# Patient Record
Sex: MALE | Race: WHITE | Employment: OTHER | URBAN - METROPOLITAN AREA
[De-identification: names, ages, dates, MRNs, and addresses within clinical notes are randomized per-mention and may not be internally consistent; named-entity substitution may affect disease eponyms.]

---

## 2023-10-24 RX ORDER — CALCITRIOL 0.25 UG/1
0.25 CAPSULE ORAL DAILY
COMMUNITY
End: 2024-04-02 | Stop reason: SDUPTHER

## 2023-10-24 RX ORDER — TADALAFIL 5 MG/1
5 TABLET ORAL DAILY
COMMUNITY
End: 2023-11-07

## 2023-10-24 RX ORDER — ATORVASTATIN CALCIUM 40 MG/1
40 TABLET, FILM COATED ORAL NIGHTLY
COMMUNITY

## 2023-10-24 RX ORDER — ERGOCALCIFEROL 1.25 MG/1
1 CAPSULE ORAL
COMMUNITY
Start: 2023-10-18

## 2023-10-24 RX ORDER — LEVOTHYROXINE SODIUM 137 UG/1
TABLET ORAL
COMMUNITY
End: 2023-10-25

## 2023-10-25 DIAGNOSIS — E03.8 OTHER SPECIFIED HYPOTHYROIDISM: Primary | ICD-10-CM

## 2023-10-25 RX ORDER — LEVOTHYROXINE SODIUM 137 UG/1
TABLET ORAL
Qty: 100 TABLET | Refills: 0 | Status: SHIPPED | OUTPATIENT
Start: 2023-10-25 | End: 2024-01-15 | Stop reason: SDUPTHER

## 2023-11-06 DIAGNOSIS — N52.9 MALE ERECTILE DISORDER: Primary | ICD-10-CM

## 2023-11-07 RX ORDER — TADALAFIL 5 MG/1
5 TABLET ORAL DAILY
Qty: 30 TABLET | Refills: 0 | Status: SHIPPED | OUTPATIENT
Start: 2023-11-07 | End: 2024-02-20

## 2024-01-06 LAB
NON-UH HIE A/G RATIO: 0.9
NON-UH HIE ALB: 3.2 G/DL (ref 3.4–5)
NON-UH HIE ALK PHOS: 54 UNIT/L (ref 45–117)
NON-UH HIE BILIRUBIN, TOTAL: 0.4 MG/DL (ref 0.3–1.2)
NON-UH HIE BUN/CREAT RATIO: 19.6
NON-UH HIE BUN: 49 MG/DL (ref 9–23)
NON-UH HIE CALCIUM: 9.4 MG/DL (ref 8.7–10.4)
NON-UH HIE CALCULATED LDL CHOLESTEROL: 70 MG/DL (ref 60–130)
NON-UH HIE CALCULATED OSMOLALITY: 290 MOSM/KG (ref 275–295)
NON-UH HIE CHLORIDE: 112 MMOL/L (ref 98–107)
NON-UH HIE CHOLESTEROL: 134 MG/DL (ref 100–200)
NON-UH HIE CO2, VENOUS: 20 MMOL/L (ref 20–31)
NON-UH HIE CREATININE: 2.5 MG/DL (ref 0.6–1.1)
NON-UH HIE GFR AA: 30
NON-UH HIE GLOBULIN: 3.7 G/DL
NON-UH HIE GLOMERULAR FILTRATION RATE: 25 ML/MIN/1.73M?
NON-UH HIE GLUCOSE: 91 MG/DL (ref 74–106)
NON-UH HIE GOT: 18 UNIT/L (ref 15–37)
NON-UH HIE GPT: 11 UNIT/L (ref 10–49)
NON-UH HIE HCT: 31.4 % (ref 41–52)
NON-UH HIE HDL CHOLESTEROL: 55 MG/DL (ref 40–60)
NON-UH HIE HGB: 10.6 G/DL (ref 13.5–17.5)
NON-UH HIE INSTR WBC ND: 5.9
NON-UH HIE K: 5.6 MMOL/L (ref 3.5–5.1)
NON-UH HIE MCH: 34.5 PG (ref 27–34)
NON-UH HIE MCHC: 33.8 G/DL (ref 32–37)
NON-UH HIE MCV: 102.2 FL (ref 80–100)
NON-UH HIE MPV: 8.3 FL (ref 7.4–10.4)
NON-UH HIE NA: 139 MMOL/L (ref 135–145)
NON-UH HIE PLATELET: 218 X10 (ref 150–450)
NON-UH HIE RBC: 3.07 X10 (ref 4.7–6.1)
NON-UH HIE RDW: 14.5 % (ref 11.5–14.5)
NON-UH HIE TOTAL CHOL/HDL CHOL RATIO: 2.4
NON-UH HIE TOTAL PROTEIN: 6.9 G/DL (ref 5.7–8.2)
NON-UH HIE TRIGLYCERIDES: 47 MG/DL (ref 30–150)
NON-UH HIE TSH: 5.69 UIU/ML (ref 0.55–4.78)
NON-UH HIE VIT D 25: 84 NG/ML
NON-UH HIE WBC: 5.9 X10 (ref 4.5–11)

## 2024-01-08 ENCOUNTER — APPOINTMENT (OUTPATIENT)
Dept: CARDIOLOGY | Facility: CLINIC | Age: 84
End: 2024-01-08
Payer: COMMERCIAL

## 2024-01-08 ENCOUNTER — APPOINTMENT (OUTPATIENT)
Dept: PRIMARY CARE | Facility: CLINIC | Age: 84
End: 2024-01-08
Payer: COMMERCIAL

## 2024-01-09 ENCOUNTER — TELEPHONE (OUTPATIENT)
Dept: PRIMARY CARE | Facility: CLINIC | Age: 84
End: 2024-01-09
Payer: COMMERCIAL

## 2024-01-09 NOTE — TELEPHONE ENCOUNTER
Pt is aware of his results.     ----- Message from Kennedy Contreras MD sent at 1/8/2024 11:23 AM EST -----  Abnormal renal function, thyroid function and anemia.  Needs to be seen to discuss treatment options  ----- Message -----  From: Rose Boyer - Lab Results In  Sent: 1/6/2024   9:41 AM EST  To: Kennedy Contreras MD

## 2024-01-12 PROBLEM — K86.89 PANCREATIC DUCT STONES (HHS-HCC): Status: ACTIVE | Noted: 2024-01-12

## 2024-01-12 PROBLEM — F17.200 CURRENT SMOKER: Status: ACTIVE | Noted: 2024-01-12

## 2024-01-12 PROBLEM — E21.3 HYPERPARATHYROIDISM (MULTI): Status: ACTIVE | Noted: 2024-01-12

## 2024-01-12 PROBLEM — K40.90 HERNIA, INGUINAL: Status: ACTIVE | Noted: 2024-01-12

## 2024-01-12 PROBLEM — F52.21 ERECTILE DYSFUNCTION OF NONORGANIC ORIGIN: Status: ACTIVE | Noted: 2024-01-12

## 2024-01-12 PROBLEM — E03.9 HYPOTHYROIDISM: Status: ACTIVE | Noted: 2024-01-12

## 2024-01-12 PROBLEM — E87.5 HYPERKALEMIA: Status: ACTIVE | Noted: 2024-01-12

## 2024-01-12 PROBLEM — R31.9 HEMATURIA: Status: ACTIVE | Noted: 2024-01-12

## 2024-01-12 PROBLEM — M54.9 BACKACHE: Status: ACTIVE | Noted: 2024-01-12

## 2024-01-12 PROBLEM — I25.10 CAD (CORONARY ARTERY DISEASE): Status: ACTIVE | Noted: 2024-01-12

## 2024-01-12 PROBLEM — N18.9 CKD (CHRONIC KIDNEY DISEASE): Status: ACTIVE | Noted: 2024-01-12

## 2024-01-12 PROBLEM — S22.081A T12 BURST FRACTURE (MULTI): Status: ACTIVE | Noted: 2024-01-12

## 2024-01-12 PROBLEM — D64.9 ANEMIA: Status: ACTIVE | Noted: 2024-01-12
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
## 2024-01-12 PROBLEM — M25.569 JOINT PAIN, KNEE: Status: ACTIVE | Noted: 2024-01-12

## 2024-01-12 PROBLEM — R91.1 LUNG NODULE: Status: ACTIVE | Noted: 2024-01-12

## 2024-01-12 PROBLEM — M71.30 SYNOVIAL CYST: Status: ACTIVE | Noted: 2024-01-12

## 2024-01-12 PROBLEM — E78.5 HYPERLIPIDEMIA: Status: ACTIVE | Noted: 2024-01-12

## 2024-01-12 PROBLEM — Q10.3: Status: ACTIVE | Noted: 2024-01-12

## 2024-01-12 PROBLEM — R94.31 ABNORMAL EKG: Status: ACTIVE | Noted: 2024-01-12

## 2024-01-12 PROBLEM — R60.9 EDEMA: Status: ACTIVE | Noted: 2024-01-12

## 2024-01-12 PROBLEM — R25.2 LEG CRAMP: Status: ACTIVE | Noted: 2024-01-12

## 2024-01-12 PROBLEM — M75.52 BURSITIS OF LEFT SHOULDER: Status: ACTIVE | Noted: 2024-01-12

## 2024-01-12 PROBLEM — D17.30 SUBCUTANEOUS LIPOMA: Status: ACTIVE | Noted: 2024-01-12

## 2024-01-12 PROBLEM — N23 RENAL COLIC: Status: ACTIVE | Noted: 2024-01-12

## 2024-01-12 PROBLEM — M67.411 GANGLION OF RIGHT SHOULDER: Status: ACTIVE | Noted: 2024-01-12

## 2024-01-12 PROBLEM — R39.16 STRAINING TO VOID: Status: ACTIVE | Noted: 2024-01-12

## 2024-01-12 PROBLEM — E55.9 VITAMIN D DEFICIENCY: Status: ACTIVE | Noted: 2024-01-12

## 2024-01-12 PROBLEM — G56.00 CARPAL TUNNEL SYNDROME: Status: ACTIVE | Noted: 2024-01-12

## 2024-01-12 PROBLEM — R63.4 ABNORMAL WEIGHT LOSS: Status: ACTIVE | Noted: 2024-01-12

## 2024-01-12 PROBLEM — S70.02XA CONTUSION OF HIP, LEFT: Status: ACTIVE | Noted: 2024-01-12

## 2024-01-15 ENCOUNTER — OFFICE VISIT (OUTPATIENT)
Dept: PRIMARY CARE | Facility: CLINIC | Age: 84
End: 2024-01-15
Payer: COMMERCIAL

## 2024-01-15 VITALS
WEIGHT: 134 LBS | SYSTOLIC BLOOD PRESSURE: 122 MMHG | OXYGEN SATURATION: 98 % | HEIGHT: 67 IN | TEMPERATURE: 98.2 F | DIASTOLIC BLOOD PRESSURE: 70 MMHG | HEART RATE: 57 BPM | BODY MASS INDEX: 21.03 KG/M2

## 2024-01-15 DIAGNOSIS — N18.30 STAGE 3 CHRONIC KIDNEY DISEASE, UNSPECIFIED WHETHER STAGE 3A OR 3B CKD (MULTI): ICD-10-CM

## 2024-01-15 DIAGNOSIS — E78.49 OTHER HYPERLIPIDEMIA: ICD-10-CM

## 2024-01-15 DIAGNOSIS — E55.9 VITAMIN D DEFICIENCY: ICD-10-CM

## 2024-01-15 DIAGNOSIS — D64.9 ANEMIA, UNSPECIFIED TYPE: Primary | ICD-10-CM

## 2024-01-15 DIAGNOSIS — I25.10 CORONARY ARTERY DISEASE INVOLVING NATIVE CORONARY ARTERY OF NATIVE HEART WITHOUT ANGINA PECTORIS: ICD-10-CM

## 2024-01-15 DIAGNOSIS — E03.8 OTHER SPECIFIED HYPOTHYROIDISM: ICD-10-CM

## 2024-01-15 DIAGNOSIS — E87.5 HYPERKALEMIA: ICD-10-CM

## 2024-01-15 DIAGNOSIS — E03.9 ACQUIRED HYPOTHYROIDISM: ICD-10-CM

## 2024-01-15 PROCEDURE — 1159F MED LIST DOCD IN RCRD: CPT | Performed by: INTERNAL MEDICINE

## 2024-01-15 PROCEDURE — 99214 OFFICE O/P EST MOD 30 MIN: CPT | Performed by: INTERNAL MEDICINE

## 2024-01-15 RX ORDER — LEVOTHYROXINE SODIUM 137 UG/1
150 TABLET ORAL DAILY
Qty: 90 TABLET | Refills: 0 | Status: SHIPPED | OUTPATIENT
Start: 2024-01-15 | End: 2024-01-19 | Stop reason: SDUPTHER

## 2024-01-15 ASSESSMENT — ENCOUNTER SYMPTOMS
OCCASIONAL FEELINGS OF UNSTEADINESS: 0
DEPRESSION: 0
LOSS OF SENSATION IN FEET: 0

## 2024-01-15 NOTE — PROGRESS NOTES
Patient is seen office today for follow-up of his medical problems including hyperlipidemia, hypothyroidism, vitamin D deficiency, hyperkalemia and other medical problems.  He also wants to discuss the result of blood test which was done earlier this month.  His BUN is 49, creatinine is 2.5, he is being followed by his nephrologist .  His hemoglobin level is still 10.6, TSH is slightly high at 5.69, vitamin D level is 84.  He denies any fever chill anorexia fever feels tired and has a feeling of cold all the time.  Denies any chest pain or palpitation.  Has no shortness of breath or wheezing.  Has no nausea matting pain abdomen.  He has no weakness of any extremity.  He has not noticed any blood in urine or stool.  Denies any joint swelling or pain.  Review of other systems are negative.    On examination:  General examination: There is no acute discomfort  Eyes: Pallor is present.  Pupils are equal and reactive to light  Oral cavity throat normal  Neck: There is no JVD or carotid bruit  Lungs: Clear to auscultation  CVS: Rhythm is regular there is no gallop murmur  Abdomen: Normal exam  CNS: Normal power  Musculoskeletal system there is no joint swelling or deformity  Legs: No edema    Assessment and plan  1.  Hyperkalemia: Potassium level is 5.6: The risk of hyperkalemia explained to the patient in detail.  Advised to have a repeat serum potassium level in 1 to 2 days.  Advised to continue with a low potassium diet  2.  Chronic kidney disease: Patient is being followed by his nephrologist .    3.  Anemia: Patient states that he has anemia for 3 decades.  He does not want to see any specialist.  I will check his iron panel, B12 stool for occult blood and urinalysis  4.  Hyperlipidemia: Will continue with the current dose of atorvastatin.-Lipid panel before next office visit  5.  Vitamin D deficiency: His recent vitamin D level is 84 on current supplements  6.  History of coronary artery disease:  Patient has no cardiac symptoms today.  7.  History of smoking and lung nodule: Patient has decided not to do any CT scan.  8.  Health maintenance: Patient does not want any cancer screening he also declined a flu vaccination and updated COVID-19 vaccine  He will be seen my office in 4 months.

## 2024-01-19 ENCOUNTER — TELEPHONE (OUTPATIENT)
Dept: PRIMARY CARE | Facility: CLINIC | Age: 84
End: 2024-01-19
Payer: COMMERCIAL

## 2024-01-19 DIAGNOSIS — E03.8 OTHER SPECIFIED HYPOTHYROIDISM: ICD-10-CM

## 2024-01-19 RX ORDER — LEVOTHYROXINE SODIUM 137 UG/1
150 TABLET ORAL DAILY
Qty: 90 TABLET | Refills: 1 | Status: SHIPPED | OUTPATIENT
Start: 2024-01-19 | End: 2024-05-01 | Stop reason: SDUPTHER

## 2024-01-19 NOTE — TELEPHONE ENCOUNTER
Patient says when he was in here for his appt, you discussed increasing his levothyroxine, he says when he went to pick it up, it is the same. Wants to know if there was a mistake or what he should do?

## 2024-01-27 LAB — NON-UH HIE K: 5.3 MMOL/L (ref 3.5–5.1)

## 2024-01-29 ENCOUNTER — TELEPHONE (OUTPATIENT)
Dept: PRIMARY CARE | Facility: CLINIC | Age: 84
End: 2024-01-29
Payer: COMMERCIAL

## 2024-01-29 DIAGNOSIS — E87.5 HYPERKALEMIA: Primary | ICD-10-CM

## 2024-01-29 NOTE — TELEPHONE ENCOUNTER
Left message for patient.     ----- Message from Kennedy Contreras MD sent at 1/29/2024  8:02 AM EST -----  Potassium is better than before but is still high.  Continue to avoid red food rich in potassium and have a repeat potassium level in 1 week  ----- Message -----  From: Rose Boyer - Lab Results In  Sent: 1/27/2024  10:05 AM EST  To: Kennedy Contreras MD

## 2024-02-10 LAB — NON-UH HIE K: 4.9 MMOL/L (ref 3.5–5.1)

## 2024-02-20 DIAGNOSIS — N52.9 MALE ERECTILE DISORDER: ICD-10-CM

## 2024-02-20 RX ORDER — TADALAFIL 5 MG/1
5 TABLET ORAL DAILY
Qty: 30 TABLET | Refills: 0 | Status: SHIPPED | OUTPATIENT
Start: 2024-02-20 | End: 2024-04-01 | Stop reason: SDUPTHER

## 2024-03-30 DIAGNOSIS — N52.9 MALE ERECTILE DISORDER: ICD-10-CM

## 2024-04-01 ENCOUNTER — TELEPHONE (OUTPATIENT)
Dept: PRIMARY CARE | Facility: CLINIC | Age: 84
End: 2024-04-01
Payer: COMMERCIAL

## 2024-04-01 DIAGNOSIS — N18.30 STAGE 3 CHRONIC KIDNEY DISEASE, UNSPECIFIED WHETHER STAGE 3A OR 3B CKD (MULTI): Primary | ICD-10-CM

## 2024-04-01 DIAGNOSIS — N52.9 MALE ERECTILE DISORDER: ICD-10-CM

## 2024-04-01 PROBLEM — M19.90 ARTHRITIS: Status: ACTIVE | Noted: 2024-04-01

## 2024-04-01 PROBLEM — R10.9 ABDOMINAL PAIN: Status: ACTIVE | Noted: 2024-04-01

## 2024-04-01 PROBLEM — M51.369 DDD (DEGENERATIVE DISC DISEASE), LUMBAR: Status: ACTIVE | Noted: 2024-04-01

## 2024-04-01 PROBLEM — M75.50 BURSITIS OF SHOULDER: Status: ACTIVE | Noted: 2024-01-12

## 2024-04-01 PROBLEM — S70.00XA CONTUSION OF HIP: Status: ACTIVE | Noted: 2024-01-12

## 2024-04-01 PROBLEM — E78.00 HYPERCHOLESTEREMIA: Status: ACTIVE | Noted: 2024-04-01

## 2024-04-01 PROBLEM — M51.36 DDD (DEGENERATIVE DISC DISEASE), LUMBAR: Status: ACTIVE | Noted: 2024-04-01

## 2024-04-01 PROBLEM — S22.089A FRACTURE OF TWELFTH THORACIC VERTEBRA (MULTI): Status: ACTIVE | Noted: 2024-01-12

## 2024-04-01 RX ORDER — TADALAFIL 5 MG/1
5 TABLET ORAL DAILY
Qty: 30 TABLET | Refills: 0 | Status: SHIPPED | OUTPATIENT
Start: 2024-04-01 | End: 2024-05-13 | Stop reason: SDUPTHER

## 2024-04-01 RX ORDER — TADALAFIL 5 MG/1
5 TABLET ORAL DAILY
Qty: 30 TABLET | Refills: 0 | OUTPATIENT
Start: 2024-04-01

## 2024-04-01 RX ORDER — CALCITRIOL 0.25 UG/1
CAPSULE ORAL
Qty: 114 CAPSULE | Refills: 0 | OUTPATIENT
Start: 2024-04-01

## 2024-04-02 RX ORDER — CALCITRIOL 0.25 UG/1
0.25 CAPSULE ORAL DAILY
Qty: 90 CAPSULE | Refills: 1 | Status: SHIPPED | OUTPATIENT
Start: 2024-04-02

## 2024-04-02 NOTE — TELEPHONE ENCOUNTER
Spoke with the pharmacy and dr hull sent in the listed medication on 06/22, 01/23 and 8/23. That was as far back as they could go.

## 2024-05-01 DIAGNOSIS — E03.8 OTHER SPECIFIED HYPOTHYROIDISM: ICD-10-CM

## 2024-05-01 RX ORDER — LEVOTHYROXINE SODIUM 137 UG/1
150 TABLET ORAL DAILY
Qty: 90 TABLET | Refills: 1 | Status: SHIPPED | OUTPATIENT
Start: 2024-05-01 | End: 2024-05-06 | Stop reason: DRUGHIGH

## 2024-05-01 NOTE — TELEPHONE ENCOUNTER
Last ov 1/15/2024    Next ov 5/13/2024    Pharmacy [facility]     The medication was Levothyroxine 137mcg, but you talked to him about changing it to 150mcg. It never changed. He wants the 150mcg.

## 2024-05-04 LAB
Lab: <1 #/HPF
NON-UH HIE AMORPHOUS SEDIMENT, U: ABNORMAL
NON-UH HIE APPEARANCE, U: CLEAR
NON-UH HIE BACTERIA, U: ABNORMAL
NON-UH HIE BILIRUBIN, U: NEGATIVE
NON-UH HIE BLOOD, U: ABNORMAL
NON-UH HIE COLOR, U: YELLOW
NON-UH HIE FERRITIN: 90 NG/ML (ref 22–322)
NON-UH HIE GLUCOSE QUAL, U: NEGATIVE
NON-UH HIE HYALINE CAST: <1 #/HPF
NON-UH HIE IRON: 86 UG/DL (ref 65–175)
NON-UH HIE K: 5.1 MMOL/L (ref 3.5–5.1)
NON-UH HIE KETONES, U: NEGATIVE
NON-UH HIE LEUKOCYTE ESTERASE, U: NEGATIVE
NON-UH HIE MUCOUS, U: ABNORMAL
NON-UH HIE NITRITE, U: NEGATIVE
NON-UH HIE PH, U: 5 (ref 4.5–8)
NON-UH HIE PROTEIN, U: ABNORMAL
NON-UH HIE RBC/HPF, U: 2 #/HPF (ref 0–3)
NON-UH HIE SATURATION: 38.7 % (ref 20–50)
NON-UH HIE SPECIFIC GRAVITY, U: 1.01 (ref 1–1.03)
NON-UH HIE TIBC: 222 UG/ML (ref 250–425)
NON-UH HIE TSH: 2.88 UIU/ML (ref 0.55–4.78)
NON-UH HIE U MICRO: ABNORMAL
NON-UH HIE UROBILINOGEN QUAL, U: ABNORMAL
NON-UH HIE VITAMIN B12: 371 PG/ML (ref 211–911)
NON-UH HIE WBC/HPF, U: 4 #/HPF (ref 0–5)

## 2024-05-06 ENCOUNTER — TELEPHONE (OUTPATIENT)
Dept: PRIMARY CARE | Facility: CLINIC | Age: 84
End: 2024-05-06
Payer: COMMERCIAL

## 2024-05-06 DIAGNOSIS — E03.9 ACQUIRED HYPOTHYROIDISM: Primary | ICD-10-CM

## 2024-05-06 DIAGNOSIS — N52.9 MALE ERECTILE DISORDER: ICD-10-CM

## 2024-05-06 DIAGNOSIS — R31.29 OTHER MICROSCOPIC HEMATURIA: Primary | ICD-10-CM

## 2024-05-06 RX ORDER — LEVOTHYROXINE SODIUM 150 UG/1
150 TABLET ORAL DAILY
Qty: 90 TABLET | Refills: 1 | Status: SHIPPED | OUTPATIENT
Start: 2024-05-06 | End: 2024-11-02

## 2024-05-06 RX ORDER — TADALAFIL 5 MG/1
5 TABLET ORAL DAILY
Qty: 30 TABLET | Refills: 0 | OUTPATIENT
Start: 2024-05-06

## 2024-05-06 NOTE — TELEPHONE ENCOUNTER
Pharmacy said they never seen a 150 mcg. Always been 137. So if you want to keep the 137 mcg then they will dispense it.

## 2024-05-09 PROBLEM — M47.816 LUMBAR SPONDYLOSIS: Status: ACTIVE | Noted: 2024-05-09

## 2024-05-09 PROBLEM — M16.11 UNILATERAL PRIMARY OSTEOARTHRITIS, RIGHT HIP: Status: ACTIVE | Noted: 2024-05-09

## 2024-05-13 ENCOUNTER — OFFICE VISIT (OUTPATIENT)
Dept: PRIMARY CARE | Facility: CLINIC | Age: 84
End: 2024-05-13
Payer: COMMERCIAL

## 2024-05-13 VITALS
WEIGHT: 133 LBS | OXYGEN SATURATION: 97 % | BODY MASS INDEX: 20.88 KG/M2 | HEIGHT: 67 IN | HEART RATE: 60 BPM | DIASTOLIC BLOOD PRESSURE: 80 MMHG | SYSTOLIC BLOOD PRESSURE: 124 MMHG

## 2024-05-13 DIAGNOSIS — E78.49 OTHER HYPERLIPIDEMIA: ICD-10-CM

## 2024-05-13 DIAGNOSIS — N18.30 STAGE 3 CHRONIC KIDNEY DISEASE, UNSPECIFIED WHETHER STAGE 3A OR 3B CKD (MULTI): ICD-10-CM

## 2024-05-13 DIAGNOSIS — N52.9 MALE ERECTILE DISORDER: ICD-10-CM

## 2024-05-13 DIAGNOSIS — F17.290 CIGAR SMOKER: ICD-10-CM

## 2024-05-13 DIAGNOSIS — Z00.00 WELLNESS EXAMINATION: Primary | ICD-10-CM

## 2024-05-13 DIAGNOSIS — E03.9 ACQUIRED HYPOTHYROIDISM: ICD-10-CM

## 2024-05-13 DIAGNOSIS — Z53.20 LUNG CANCER SCREENING DECLINED BY PATIENT: ICD-10-CM

## 2024-05-13 DIAGNOSIS — D64.9 ANEMIA, UNSPECIFIED TYPE: ICD-10-CM

## 2024-05-13 DIAGNOSIS — E55.9 VITAMIN D DEFICIENCY: ICD-10-CM

## 2024-05-13 PROCEDURE — 99397 PER PM REEVAL EST PAT 65+ YR: CPT | Performed by: INTERNAL MEDICINE

## 2024-05-13 PROCEDURE — 1170F FXNL STATUS ASSESSED: CPT | Performed by: INTERNAL MEDICINE

## 2024-05-13 PROCEDURE — 1159F MED LIST DOCD IN RCRD: CPT | Performed by: INTERNAL MEDICINE

## 2024-05-13 RX ORDER — TADALAFIL 5 MG/1
5 TABLET ORAL DAILY PRN
Qty: 15 TABLET | Refills: 2 | Status: SHIPPED | OUTPATIENT
Start: 2024-05-13

## 2024-05-13 ASSESSMENT — ENCOUNTER SYMPTOMS
OCCASIONAL FEELINGS OF UNSTEADINESS: 0
LOSS OF SENSATION IN FEET: 0
DEPRESSION: 0

## 2024-05-13 ASSESSMENT — ACTIVITIES OF DAILY LIVING (ADL)
DRESSING: INDEPENDENT
GROCERY_SHOPPING: INDEPENDENT
DOING_HOUSEWORK: INDEPENDENT
BATHING: INDEPENDENT
MANAGING_FINANCES: INDEPENDENT
TAKING_MEDICATION: INDEPENDENT

## 2024-05-13 ASSESSMENT — PATIENT HEALTH QUESTIONNAIRE - PHQ9
1. LITTLE INTEREST OR PLEASURE IN DOING THINGS: NOT AT ALL
2. FEELING DOWN, DEPRESSED OR HOPELESS: NOT AT ALL
SUM OF ALL RESPONSES TO PHQ9 QUESTIONS 1 AND 2: 0

## 2024-05-13 NOTE — PROGRESS NOTES
Patient is seen office today for annual wellness examination and for follow-up of his medical problems including hyperlipidemia, hypothyroidism, vitamin D deficiency, smoking and other medical problems.  He continues to smoke cigar.  He is not mentally prepared to stop.  He is also not inclined to do any screening for lung cancer.  He denies any fever chills or EXTR.  Has no chest pain or palpitation.  Gets occasional dry cough.  Has no nausea matting pain abdomen.  He has not noticed any visible blood in the stool or urine.  He has no weakness of any extremity.  He has some erectile dysfunction.  Denies any focal weakness of any extremity.  Review of other systems are negative.    On examination:  General examination: Normal  Eyes: Pallor is present.  Pupils are equal and reactive to light  Oral cavity throat normal  Neck: There is no thyroid lumps or JVD  Lungs: Breath sounds are normal  CVS: Heart sounds are regular there is no gallop murmur  Abdomen: Normal exam  CNS: Power and sensations are normal  Joints: No swelling  Legs: No edema    Labs: His ferritin level is 90, B12 is 371, TIBC is low at 222.  Urinalysis shows a small amount of blood.  Stool for occult blood is pending.    Assessment and plan  1.  Wellness examination: Patient's physical examination is unremarkable.  Patient does not want any cancer screenings.  He also declined low-dose CT scan of the lung for lung cancer screening.  Diet excise activity reviewed with the patient.  He does not want to take any updated COVID-19 vaccine also.  He is not interested in any other vaccination at this time.  2.  Anemia: Possibly due to chronic renal disease.  Patient is ordered to have a stool for occult blood repeat urinalysis and repeat CBC  3.  Hyperlipidemia: He is advised to continue the current dose of atorvastatin.  Repeat lipid panel is ordered  4.  Hypothyroidism: I will check his TSH again  5.  Male erectile dysfunction: Patient is prescription for  Cialis is renewed as requested by him  6.  Vitamin D deficiency: I will check his vitamin D level again  7.  Chronic renal disease: Patient is advised to make a follow-up appointment with .  The result of blood chemistry will be forwarded to his nephrologist.  Will be seen in office in 3 or 4 months.

## 2024-05-18 LAB
NON-UH HIE A/G RATIO: 0.9
NON-UH HIE ALB: 3.1 G/DL (ref 3.4–5)
NON-UH HIE ALK PHOS: 49 UNIT/L (ref 45–117)
NON-UH HIE APPEARANCE, U: CLEAR
NON-UH HIE BASO COUNT: 0.04 X1000 (ref 0–0.2)
NON-UH HIE BASOS %: 0.8 %
NON-UH HIE BILIRUBIN, TOTAL: 0.6 MG/DL (ref 0.3–1.2)
NON-UH HIE BILIRUBIN, U: NEGATIVE
NON-UH HIE BLOOD, U: ABNORMAL
NON-UH HIE BUN/CREAT RATIO: 18.4
NON-UH HIE BUN: 46 MG/DL (ref 9–23)
NON-UH HIE CALCIUM: 9.3 MG/DL (ref 8.7–10.4)
NON-UH HIE CALCULATED LDL CHOLESTEROL: 64 MG/DL (ref 60–130)
NON-UH HIE CALCULATED OSMOLALITY: 289 MOSM/KG (ref 275–295)
NON-UH HIE CHLORIDE: 113 MMOL/L (ref 98–107)
NON-UH HIE CHOLESTEROL: 126 MG/DL (ref 100–200)
NON-UH HIE CO2, VENOUS: 22 MMOL/L (ref 20–31)
NON-UH HIE COLOR, U: YELLOW
NON-UH HIE CREATININE: 2.5 MG/DL (ref 0.6–1.1)
NON-UH HIE DIFF?: NO
NON-UH HIE EOS COUNT: 0.23 X1000 (ref 0–0.5)
NON-UH HIE EOSIN %: 4.6 %
NON-UH HIE GFR AA: 30
NON-UH HIE GLOBULIN: 3.4 G/DL
NON-UH HIE GLOMERULAR FILTRATION RATE: 25 ML/MIN/1.73M?
NON-UH HIE GLUCOSE QUAL, U: NEGATIVE
NON-UH HIE GLUCOSE: 88 MG/DL (ref 74–106)
NON-UH HIE GOT: 19 UNIT/L (ref 15–37)
NON-UH HIE GPT: 10 UNIT/L (ref 10–49)
NON-UH HIE HCT: 32.2 % (ref 41–52)
NON-UH HIE HDL CHOLESTEROL: 48 MG/DL (ref 40–60)
NON-UH HIE HGB: 10.7 G/DL (ref 13.5–17.5)
NON-UH HIE HYALINE CAST: <1 #/HPF
NON-UH HIE INSTR WBC: 5
NON-UH HIE K: 5 MMOL/L (ref 3.5–5.1)
NON-UH HIE KETONES, U: NEGATIVE
NON-UH HIE LEUKOCYTE ESTERASE, U: NEGATIVE
NON-UH HIE LYMPH %: 47.5 %
NON-UH HIE LYMPH COUNT: 2.38 X1000 (ref 1.2–4.8)
NON-UH HIE MCH: 33.7 PG (ref 27–34)
NON-UH HIE MCHC: 33.3 G/DL (ref 32–37)
NON-UH HIE MCV: 101.3 FL (ref 80–100)
NON-UH HIE MONO %: 6.5 %
NON-UH HIE MONO COUNT: 0.33 X1000 (ref 0.1–1)
NON-UH HIE MPV: 7.9 FL (ref 7.4–10.4)
NON-UH HIE NA: 139 MMOL/L (ref 135–145)
NON-UH HIE NEUTROPHIL %: 40.7 %
NON-UH HIE NEUTROPHIL COUNT (ANC): 2.04 X1000 (ref 1.4–8.8)
NON-UH HIE NITRITE, U: NEGATIVE
NON-UH HIE NUCLEATED RBC: 0 /100WBC
NON-UH HIE PH, U: 5 (ref 4.5–8)
NON-UH HIE PLATELET: 198 X10 (ref 150–450)
NON-UH HIE PROTEIN, U: ABNORMAL
NON-UH HIE RBC/HPF, U: 1 #/HPF (ref 0–3)
NON-UH HIE RBC: 3.18 X10 (ref 4.7–6.1)
NON-UH HIE RDW: 14.4 % (ref 11.5–14.5)
NON-UH HIE SPECIFIC GRAVITY, U: 1.01 (ref 1–1.03)
NON-UH HIE TOTAL CHOL/HDL CHOL RATIO: 2.6
NON-UH HIE TOTAL PROTEIN: 6.5 G/DL (ref 5.7–8.2)
NON-UH HIE TRIGLYCERIDES: 71 MG/DL (ref 30–150)
NON-UH HIE TSH: 3.99 UIU/ML (ref 0.55–4.78)
NON-UH HIE U MICRO: ABNORMAL
NON-UH HIE UROBILINOGEN QUAL, U: ABNORMAL
NON-UH HIE VIT D 25: 70 NG/ML
NON-UH HIE WBC/HPF, U: <1 #/HPF (ref 0–5)
NON-UH HIE WBC: 5 X10 (ref 4.5–11)

## 2024-05-20 DIAGNOSIS — R31.29 OTHER MICROSCOPIC HEMATURIA: Primary | ICD-10-CM

## 2024-05-21 ENCOUNTER — TELEPHONE (OUTPATIENT)
Dept: CARDIOLOGY | Facility: CLINIC | Age: 84
End: 2024-05-21
Payer: COMMERCIAL

## 2024-05-21 ENCOUNTER — TELEPHONE (OUTPATIENT)
Dept: PRIMARY CARE | Facility: CLINIC | Age: 84
End: 2024-05-21
Payer: COMMERCIAL

## 2024-05-21 NOTE — TELEPHONE ENCOUNTER
Left pt a message, will mail out his referral.     ----- Message from Kennedy Contreras MD sent at 5/20/2024 12:49 PM EDT -----  1.  Abnormal renal function.  Fax the results to his nephrologist .  Advised the patient to make a follow-up appointment with his nephrologist.  2.  Has blood in urine.  I referred him to urology.  Referral is in the chart  3.  Anemia as before.  Other results are okay  ----- Message -----  From: Rose Boyer - Lab Results In  Sent: 5/18/2024   9:32 AM EDT  To: Kennedy Contreras MD

## 2024-05-29 LAB — NON-UH HIE OCCULT BLOOD, STOOL: POSITIVE

## 2024-05-30 DIAGNOSIS — D64.9 ANEMIA, UNSPECIFIED TYPE: ICD-10-CM

## 2024-05-30 DIAGNOSIS — R19.5 HEME POSITIVE STOOL: Primary | ICD-10-CM

## 2024-05-31 ENCOUNTER — TELEPHONE (OUTPATIENT)
Dept: PRIMARY CARE | Facility: CLINIC | Age: 84
End: 2024-05-31
Payer: COMMERCIAL

## 2024-05-31 NOTE — TELEPHONE ENCOUNTER
Pt is aware of his results and referral.     ----- Message from Kennedy Contreras MD sent at 5/30/2024 12:43 PM EDT -----  There is blood in the stool.  He needs to see gastroenterologist for further workup.  Order and referral is in the chart  ----- Message -----  From: Rose Boyer - Lab Results In  Sent: 5/29/2024   7:08 PM EDT  To: Kennedy Contreras MD

## 2024-06-24 DIAGNOSIS — E78.49 OTHER HYPERLIPIDEMIA: Primary | ICD-10-CM

## 2024-06-24 RX ORDER — ATORVASTATIN CALCIUM 40 MG/1
40 TABLET, FILM COATED ORAL NIGHTLY
Qty: 90 TABLET | Refills: 1 | Status: SHIPPED | OUTPATIENT
Start: 2024-06-24

## 2024-08-01 DIAGNOSIS — N52.9 MALE ERECTILE DISORDER: ICD-10-CM

## 2024-08-01 RX ORDER — TADALAFIL 5 MG/1
5 TABLET ORAL DAILY PRN
Qty: 15 TABLET | Refills: 0 | Status: SHIPPED | OUTPATIENT
Start: 2024-08-01

## 2024-08-26 ENCOUNTER — APPOINTMENT (OUTPATIENT)
Dept: GASTROENTEROLOGY | Facility: CLINIC | Age: 84
End: 2024-08-26
Payer: COMMERCIAL

## 2024-09-03 DIAGNOSIS — N52.9 MALE ERECTILE DISORDER: ICD-10-CM

## 2024-09-04 RX ORDER — TADALAFIL 5 MG/1
5 TABLET ORAL DAILY PRN
Qty: 15 TABLET | Refills: 0 | Status: SHIPPED | OUTPATIENT
Start: 2024-09-04

## 2024-09-18 DIAGNOSIS — N18.30 STAGE 3 CHRONIC KIDNEY DISEASE, UNSPECIFIED WHETHER STAGE 3A OR 3B CKD (MULTI): ICD-10-CM

## 2024-09-18 RX ORDER — CALCITRIOL 0.25 UG/1
0.25 CAPSULE ORAL DAILY
Qty: 90 CAPSULE | Refills: 0 | Status: SHIPPED | OUTPATIENT
Start: 2024-09-18

## 2024-09-20 DIAGNOSIS — N52.9 MALE ERECTILE DISORDER: ICD-10-CM

## 2024-09-20 RX ORDER — TADALAFIL 5 MG/1
5 TABLET ORAL DAILY PRN
Qty: 15 TABLET | Refills: 0 | Status: SHIPPED | OUTPATIENT
Start: 2024-09-20

## 2024-11-11 DIAGNOSIS — N52.9 MALE ERECTILE DISORDER: ICD-10-CM

## 2024-11-11 RX ORDER — TADALAFIL 5 MG/1
5 TABLET ORAL DAILY PRN
Qty: 15 TABLET | Refills: 0 | Status: SHIPPED | OUTPATIENT
Start: 2024-11-11

## 2024-11-25 ENCOUNTER — APPOINTMENT (OUTPATIENT)
Dept: GASTROENTEROLOGY | Facility: CLINIC | Age: 84
End: 2024-11-25
Payer: COMMERCIAL

## 2024-12-13 DIAGNOSIS — N18.30 STAGE 3 CHRONIC KIDNEY DISEASE, UNSPECIFIED WHETHER STAGE 3A OR 3B CKD (MULTI): ICD-10-CM

## 2024-12-13 DIAGNOSIS — N52.9 MALE ERECTILE DISORDER: ICD-10-CM

## 2024-12-13 RX ORDER — TADALAFIL 5 MG/1
5 TABLET ORAL DAILY PRN
Qty: 15 TABLET | Refills: 0 | Status: SHIPPED | OUTPATIENT
Start: 2024-12-13

## 2024-12-13 RX ORDER — CALCITRIOL 0.25 UG/1
0.25 CAPSULE ORAL DAILY
Qty: 90 CAPSULE | Refills: 0 | Status: SHIPPED | OUTPATIENT
Start: 2024-12-13

## 2025-01-01 DIAGNOSIS — E03.9 ACQUIRED HYPOTHYROIDISM: ICD-10-CM

## 2025-01-02 RX ORDER — LEVOTHYROXINE SODIUM 150 UG/1
150 TABLET ORAL DAILY
Qty: 90 TABLET | Refills: 0 | Status: SHIPPED | OUTPATIENT
Start: 2025-01-02

## 2025-01-24 DIAGNOSIS — N52.9 MALE ERECTILE DISORDER: ICD-10-CM

## 2025-01-24 RX ORDER — TADALAFIL 5 MG/1
5 TABLET ORAL DAILY PRN
Qty: 15 TABLET | Refills: 0 | Status: SHIPPED | OUTPATIENT
Start: 2025-01-24

## 2025-01-27 ENCOUNTER — APPOINTMENT (OUTPATIENT)
Dept: GASTROENTEROLOGY | Facility: CLINIC | Age: 85
End: 2025-01-27
Payer: COMMERCIAL

## 2025-02-13 DIAGNOSIS — E78.49 OTHER HYPERLIPIDEMIA: ICD-10-CM

## 2025-02-13 RX ORDER — ATORVASTATIN CALCIUM 40 MG/1
40 TABLET, FILM COATED ORAL NIGHTLY
Qty: 90 TABLET | Refills: 0 | Status: SHIPPED | OUTPATIENT
Start: 2025-02-13

## 2025-03-05 DIAGNOSIS — N18.30 STAGE 3 CHRONIC KIDNEY DISEASE, UNSPECIFIED WHETHER STAGE 3A OR 3B CKD (MULTI): ICD-10-CM

## 2025-03-06 RX ORDER — CALCITRIOL 0.25 UG/1
0.25 CAPSULE ORAL DAILY
Qty: 90 CAPSULE | Refills: 0 | Status: SHIPPED | OUTPATIENT
Start: 2025-03-06

## 2025-03-14 DIAGNOSIS — N52.9 MALE ERECTILE DISORDER: ICD-10-CM

## 2025-03-14 RX ORDER — TADALAFIL 5 MG/1
5 TABLET ORAL DAILY PRN
Qty: 15 TABLET | Refills: 0 | Status: SHIPPED | OUTPATIENT
Start: 2025-03-14

## 2025-03-27 DIAGNOSIS — E03.9 ACQUIRED HYPOTHYROIDISM: ICD-10-CM

## 2025-04-01 DIAGNOSIS — E03.9 ACQUIRED HYPOTHYROIDISM: ICD-10-CM

## 2025-04-01 DIAGNOSIS — N52.9 MALE ERECTILE DISORDER: ICD-10-CM

## 2025-04-01 RX ORDER — TADALAFIL 5 MG/1
5 TABLET ORAL DAILY PRN
Qty: 15 TABLET | Refills: 0 | OUTPATIENT
Start: 2025-04-01

## 2025-04-01 RX ORDER — LEVOTHYROXINE SODIUM 150 UG/1
150 TABLET ORAL DAILY
Qty: 90 TABLET | Refills: 0 | OUTPATIENT
Start: 2025-04-01

## 2025-04-01 RX ORDER — LEVOTHYROXINE SODIUM 150 UG/1
150 TABLET ORAL DAILY
Qty: 30 TABLET | Refills: 0 | Status: SHIPPED | OUTPATIENT
Start: 2025-04-01

## 2025-04-07 ENCOUNTER — APPOINTMENT (OUTPATIENT)
Dept: PRIMARY CARE | Facility: CLINIC | Age: 85
End: 2025-04-07

## 2025-04-28 ENCOUNTER — APPOINTMENT (OUTPATIENT)
Dept: PRIMARY CARE | Facility: CLINIC | Age: 85
End: 2025-04-28
Payer: COMMERCIAL

## 2025-04-28 VITALS
SYSTOLIC BLOOD PRESSURE: 120 MMHG | HEIGHT: 67 IN | DIASTOLIC BLOOD PRESSURE: 80 MMHG | HEART RATE: 64 BPM | BODY MASS INDEX: 19.9 KG/M2 | WEIGHT: 126.8 LBS | OXYGEN SATURATION: 95 %

## 2025-04-28 DIAGNOSIS — N18.30 STAGE 3 CHRONIC KIDNEY DISEASE, UNSPECIFIED WHETHER STAGE 3A OR 3B CKD (MULTI): ICD-10-CM

## 2025-04-28 DIAGNOSIS — Z53.20 LUNG CANCER SCREENING DECLINED BY PATIENT: ICD-10-CM

## 2025-04-28 DIAGNOSIS — E78.49 OTHER HYPERLIPIDEMIA: ICD-10-CM

## 2025-04-28 DIAGNOSIS — E03.9 ACQUIRED HYPOTHYROIDISM: ICD-10-CM

## 2025-04-28 DIAGNOSIS — D64.9 ANEMIA, UNSPECIFIED TYPE: ICD-10-CM

## 2025-04-28 DIAGNOSIS — Z00.00 WELLNESS EXAMINATION: Primary | ICD-10-CM

## 2025-04-28 DIAGNOSIS — N52.9 MALE ERECTILE DISORDER: ICD-10-CM

## 2025-04-28 DIAGNOSIS — E55.9 VITAMIN D DEFICIENCY: ICD-10-CM

## 2025-04-28 PROCEDURE — 1170F FXNL STATUS ASSESSED: CPT | Performed by: INTERNAL MEDICINE

## 2025-04-28 PROCEDURE — 1159F MED LIST DOCD IN RCRD: CPT | Performed by: INTERNAL MEDICINE

## 2025-04-28 PROCEDURE — G0439 PPPS, SUBSEQ VISIT: HCPCS | Performed by: INTERNAL MEDICINE

## 2025-04-28 RX ORDER — TADALAFIL 5 MG/1
5 TABLET ORAL DAILY PRN
Qty: 15 TABLET | Refills: 3 | Status: SHIPPED | OUTPATIENT
Start: 2025-04-28

## 2025-04-28 RX ORDER — LEVOTHYROXINE SODIUM 150 UG/1
150 TABLET ORAL DAILY
Qty: 90 TABLET | Refills: 1 | Status: SHIPPED | OUTPATIENT
Start: 2025-04-28

## 2025-04-28 RX ORDER — ATORVASTATIN CALCIUM 40 MG/1
40 TABLET, FILM COATED ORAL NIGHTLY
Qty: 90 TABLET | Refills: 1 | Status: SHIPPED | OUTPATIENT
Start: 2025-04-28

## 2025-04-28 ASSESSMENT — ACTIVITIES OF DAILY LIVING (ADL)
BATHING: INDEPENDENT
DOING_HOUSEWORK: INDEPENDENT
MANAGING_FINANCES: INDEPENDENT
DRESSING: INDEPENDENT
GROCERY_SHOPPING: INDEPENDENT
TAKING_MEDICATION: INDEPENDENT

## 2025-04-28 ASSESSMENT — PATIENT HEALTH QUESTIONNAIRE - PHQ9
1. LITTLE INTEREST OR PLEASURE IN DOING THINGS: NOT AT ALL
SUM OF ALL RESPONSES TO PHQ9 QUESTIONS 1 AND 2: 0
1. LITTLE INTEREST OR PLEASURE IN DOING THINGS: NOT AT ALL
2. FEELING DOWN, DEPRESSED OR HOPELESS: NOT AT ALL
2. FEELING DOWN, DEPRESSED OR HOPELESS: NOT AT ALL
SUM OF ALL RESPONSES TO PHQ9 QUESTIONS 1 & 2: 0

## 2025-04-28 ASSESSMENT — ENCOUNTER SYMPTOMS
LOSS OF SENSATION IN FEET: 0
DEPRESSION: 0
OCCASIONAL FEELINGS OF UNSTEADINESS: 0

## 2025-04-28 NOTE — PROGRESS NOTES
"Internal Medicine Outpatient Visit  Chief Complaint   Patient presents with    Medicare Annual Wellness Visit Subsequent     Wellness visit        HPI: Miguel Chan is of 84 y.o. who is here for Internal Visit for the following issues:  Patient is seen office today for annual wellness examination and follow-up of his medical problems including hyperlipidemia, hypothyroidism, vitamin D deficiency, smoking and other medical problems.  He also needs a refill on all his prescriptions.  He is also due for some blood test he wants a order for the blood test.  He denies any fever chill Anexsia.  Has no chest pain or palpitation.  Has some dry cough.  Unfortunately he continues to smoke 6 or 7 cigars every day.  He is not mentally prepared to stop it yet.  He also declined low-dose CT for lung cancer screening.  He has the same opinion today.  He denies any nausea vomiting abdominal pain.  He has not noticed any blood in urine or stool.  He has no numbness, tingling or weakness of any extremity.  Denies any joint swelling or pain.  He has no cold heat intolerance or any symptom of thyroid dysfunction.  Review of other systems are negative.    Surgical History[1]    Family History[2]      Medications Ordered Prior to Encounter[3]    Blood pressure 120/80, pulse 64, height 1.702 m (5' 7\"), weight 57.5 kg (126 lb 12.8 oz), SpO2 95%.  Body mass index is 19.86 kg/m².  General examination: No acute discomfort  Eyes: External ocular movements are normal.  Pupils are equal and reactive to light  Neck: There is no carotid bruit or JVD  Lungs: Breath sounds are symmetrically diminished  CVS: Heart sounds are regular there is no gallop murmur  Abdomen: Soft there is no tenderness or organomegaly  CNS: Normal power  Musculoskeletal system there is no joint swelling  Legs: No edema    Assessment and plan:    1. Wellness examination (Primary)  Physical examination is unremarkable except diminished breath sound possibly has underlying " COPD he is not willing for any further testing.  He does not want any cancer screenings in view of his age.  He also declined low-dose CT scan of the lung for lung cancer screening.  He is due for RSV vaccine, herpes zoster vaccination and COVID-19 vaccine list is provided to the patient to take dose in the pharmacy however the patient seems to be reluctant to take any vaccination at this time    2. Other hyperlipidemia  Prescription for atorvastatin is renewed.  Repeat lipid panel is ordered    3. Acquired hypothyroidism  Prescription for levothyroxine is renewed.  Repeat TSH is ordered    4. Stage 3 chronic kidney disease, unspecified whether stage 3a or 3b CKD (Multi)  Patient is being followed by his nephrologist  whom he saw a few months ago.    5. Vitamin D deficiency  I will check vitamin D level again    6. Anemia, unspecified type  Repeat CBC is ordered    7. Lung cancer screening declined by patient  Declined by the patient                         [1]   Past Surgical History:  Procedure Laterality Date    OTHER SURGICAL HISTORY  02/07/2022    Colonoscopy   [2]   Family History  Problem Relation Name Age of Onset    Other (larynx cancer) Father     [3]   Current Outpatient Medications on File Prior to Visit   Medication Sig Dispense Refill    atorvastatin (Lipitor) 40 mg tablet TAKE 1 TABLET BY MOUTH ONCE DAILY AT BEDTIME 90 tablet 0    calcitriol (Rocaltrol) 0.25 mcg capsule Take 1 capsule by mouth once daily 90 capsule 0    ergocalciferol (Vitamin D-2) 1.25 MG (23587 UT) capsule Take 1 capsule (1.25 mg) by mouth 1 (one) time per week.      levothyroxine (Synthroid, Levoxyl) 150 mcg tablet Take 1 tablet (150 mcg) by mouth once daily. 30 tablet 0    tadalafil (Cialis) 5 mg tablet TAKE 1 TABLET BY MOUTH ONCE DAILY AS NEEDED FOR ERECTILE DYSFUNCTION 15 tablet 0     No current facility-administered medications on file prior to visit.

## 2025-05-03 LAB
NON-UH HIE A/G RATIO: 0.9
NON-UH HIE ALB: 3.6 G/DL (ref 3.4–5)
NON-UH HIE ALK PHOS: 43 UNIT/L (ref 45–117)
NON-UH HIE BASO COUNT: 0.05 X1000 (ref 0–0.2)
NON-UH HIE BASOS %: 0.8 %
NON-UH HIE BILIRUBIN, TOTAL: 0.5 MG/DL (ref 0.3–1.2)
NON-UH HIE BUN/CREAT RATIO: 15
NON-UH HIE BUN: 39 MG/DL (ref 9–23)
NON-UH HIE CALCIUM: 9.9 MG/DL (ref 8.7–10.4)
NON-UH HIE CALCULATED LDL CHOLESTEROL: 71 MG/DL (ref 60–130)
NON-UH HIE CALCULATED OSMOLALITY: 289 MOSM/KG (ref 275–295)
NON-UH HIE CHLORIDE: 107 MMOL/L (ref 98–107)
NON-UH HIE CHOLESTEROL: 134 MG/DL (ref 100–200)
NON-UH HIE CO2, VENOUS: 23 MMOL/L (ref 20–31)
NON-UH HIE CREATININE, URINE MG/DL: 108.6 MG/DL
NON-UH HIE CREATININE: 2.6 MG/DL (ref 0.6–1.1)
NON-UH HIE DIFF?: ABNORMAL
NON-UH HIE EOS COUNT: 0.25 X1000 (ref 0–0.5)
NON-UH HIE EOSIN %: 4.1 %
NON-UH HIE GFR AA: 29
NON-UH HIE GLOBULIN: 3.9 G/DL
NON-UH HIE GLOMERULAR FILTRATION RATE: 24 ML/MIN/1.73M?
NON-UH HIE GLUCOSE: 91 MG/DL (ref 74–106)
NON-UH HIE GOT: 15 UNIT/L (ref 15–37)
NON-UH HIE GPT: <7 UNIT/L (ref 10–49)
NON-UH HIE HCT: 33.1 % (ref 41–52)
NON-UH HIE HDL CHOLESTEROL: 48 MG/DL (ref 40–60)
NON-UH HIE HGB: 11 G/DL (ref 13.5–17.5)
NON-UH HIE INSTR WBC: 6.1
NON-UH HIE K: 4.5 MMOL/L (ref 3.5–5.1)
NON-UH HIE LYMPH %: 49.4 %
NON-UH HIE LYMPH COUNT: 3.03 X1000 (ref 1.2–4.8)
NON-UH HIE MCH: 33.4 PG (ref 27–34)
NON-UH HIE MCHC: 33.3 G/DL (ref 32–37)
NON-UH HIE MCV: 100.4 FL (ref 80–100)
NON-UH HIE MICROALBUMIN, URINE MG/L: 152 MG/L
NON-UH HIE MICROALBUMIN/CREATININE RATIO: 140 MG MALB/GM CREAT (ref 0–30)
NON-UH HIE MONO %: 7.8 %
NON-UH HIE MONO COUNT: 0.48 X1000 (ref 0.1–1)
NON-UH HIE MPV: 8.3 FL (ref 7.4–10.4)
NON-UH HIE NA: 140 MMOL/L (ref 135–145)
NON-UH HIE NEUTROPHIL %: 37.8 %
NON-UH HIE NEUTROPHIL COUNT (ANC): 2.32 X1000 (ref 1.4–8.8)
NON-UH HIE NUCLEATED RBC: 0 /100WBC
NON-UH HIE PLATELET: 210 X10 (ref 150–450)
NON-UH HIE RBC: 3.3 X10 (ref 4.7–6.1)
NON-UH HIE RDW: 14 % (ref 11.5–14.5)
NON-UH HIE TOTAL CHOL/HDL CHOL RATIO: 2.8
NON-UH HIE TOTAL PROTEIN: 7.5 G/DL (ref 5.7–8.2)
NON-UH HIE TRIGLYCERIDES: 74 MG/DL (ref 30–150)
NON-UH HIE TSH: 0.59 UIU/ML (ref 0.55–4.78)
NON-UH HIE VIT D 25: 121 NG/ML
NON-UH HIE WBC: 6.1 X10 (ref 4.5–11)

## 2025-05-05 DIAGNOSIS — E67.3 HYPERVITAMINOSIS D: Primary | ICD-10-CM

## 2025-05-06 ENCOUNTER — TELEPHONE (OUTPATIENT)
Dept: PRIMARY CARE | Facility: CLINIC | Age: 85
End: 2025-05-06
Payer: COMMERCIAL

## 2025-05-06 NOTE — TELEPHONE ENCOUNTER
Patient is aware of his results and message about medication.  ----- Message from Kennedy Contreras sent at 5/5/2025 12:14 PM EDT -----  1.  Vitamin D level is very high.  Stop taking vitamin D.  Also contact your nephrologist about other meds and Calcitrol.  2.  Has mild anemia and abnormal kidney function as before.  Keep your follow-up appointment with your kidney specialist.  Repeat vitamin D level in 1 month.  Orders are in the chart  ----- Message -----  From: Rose Boyer - Lab Results In  Sent: 5/3/2025  10:42 AM EDT  To: Kennedy Contreras MD

## 2025-05-30 DIAGNOSIS — N18.30 STAGE 3 CHRONIC KIDNEY DISEASE, UNSPECIFIED WHETHER STAGE 3A OR 3B CKD (MULTI): ICD-10-CM

## 2025-05-30 RX ORDER — CALCITRIOL 0.25 UG/1
0.25 CAPSULE ORAL DAILY
Qty: 90 CAPSULE | Refills: 0 | Status: SHIPPED | OUTPATIENT
Start: 2025-05-30

## 2025-08-19 DIAGNOSIS — N18.30 STAGE 3 CHRONIC KIDNEY DISEASE, UNSPECIFIED WHETHER STAGE 3A OR 3B CKD (MULTI): ICD-10-CM

## 2025-08-19 DIAGNOSIS — N52.9 MALE ERECTILE DISORDER: ICD-10-CM

## 2025-08-19 RX ORDER — TADALAFIL 5 MG/1
5 TABLET ORAL DAILY PRN
Qty: 15 TABLET | Refills: 0 | Status: SHIPPED | OUTPATIENT
Start: 2025-08-19

## 2025-08-19 RX ORDER — CALCITRIOL 0.25 UG/1
0.25 CAPSULE ORAL DAILY
Qty: 90 CAPSULE | Refills: 0 | Status: SHIPPED | OUTPATIENT
Start: 2025-08-19